# Patient Record
Sex: FEMALE | Race: WHITE | NOT HISPANIC OR LATINO
[De-identification: names, ages, dates, MRNs, and addresses within clinical notes are randomized per-mention and may not be internally consistent; named-entity substitution may affect disease eponyms.]

---
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
## 2022-08-29 ENCOUNTER — APPOINTMENT (OUTPATIENT)
Dept: PEDIATRICS | Facility: CLINIC | Age: 22
End: 2022-08-29

## 2022-08-29 VITALS
HEIGHT: 65.5 IN | HEART RATE: 83 BPM | TEMPERATURE: 98.7 F | WEIGHT: 117.13 LBS | DIASTOLIC BLOOD PRESSURE: 71 MMHG | BODY MASS INDEX: 19.28 KG/M2 | SYSTOLIC BLOOD PRESSURE: 107 MMHG

## 2022-08-29 DIAGNOSIS — Z87.09 PERSONAL HISTORY OF OTHER DISEASES OF THE RESPIRATORY SYSTEM: ICD-10-CM

## 2022-08-29 DIAGNOSIS — J18.9 PNEUMONIA, UNSPECIFIED ORGANISM: ICD-10-CM

## 2022-08-29 DIAGNOSIS — H00.029 HORDEOLUM INTERNUM UNSPECIFIED EYE, UNSPECIFIED EYELID: ICD-10-CM

## 2022-08-29 DIAGNOSIS — M41.9 SCOLIOSIS, UNSPECIFIED: ICD-10-CM

## 2022-08-29 DIAGNOSIS — Z00.129 ENCOUNTER FOR ROUTINE CHILD HEALTH EXAMINATION W/OUT ABNORMAL FINDINGS: ICD-10-CM

## 2022-08-29 DIAGNOSIS — R53.81 OTHER MALAISE: ICD-10-CM

## 2022-08-29 DIAGNOSIS — Z23 ENCOUNTER FOR IMMUNIZATION: ICD-10-CM

## 2022-08-29 DIAGNOSIS — F32.A DEPRESSION, UNSPECIFIED: ICD-10-CM

## 2022-08-29 DIAGNOSIS — Z09 ENCOUNTER FOR FOLLOW-UP EXAMINATION AFTER COMPLETED TREATMENT FOR CONDITIONS OTHER THAN MALIGNANT NEOPLASM: ICD-10-CM

## 2022-08-29 DIAGNOSIS — Z20.822 CONTACT WITH AND (SUSPECTED) EXPOSURE TO COVID-19: ICD-10-CM

## 2022-08-29 PROCEDURE — 96127 BRIEF EMOTIONAL/BEHAV ASSMT: CPT

## 2022-08-29 PROCEDURE — 90471 IMMUNIZATION ADMIN: CPT

## 2022-08-29 PROCEDURE — 96160 PT-FOCUSED HLTH RISK ASSMT: CPT | Mod: 59

## 2022-08-29 PROCEDURE — 99395 PREV VISIT EST AGE 18-39: CPT | Mod: 25

## 2022-08-29 PROCEDURE — 90621 MENB-FHBP VACC 2/3 DOSE IM: CPT

## 2022-08-29 RX ORDER — AMOXICILLIN AND CLAVULANATE POTASSIUM 875; 125 MG/1; MG/1
875-125 TABLET, COATED ORAL
Qty: 14 | Refills: 0 | Status: COMPLETED | COMMUNITY
Start: 2021-05-13 | End: 2022-08-29

## 2022-08-29 RX ORDER — AZITHROMYCIN 250 MG/1
250 TABLET, FILM COATED ORAL
Qty: 1 | Refills: 0 | Status: COMPLETED | COMMUNITY
Start: 2021-05-13 | End: 2022-08-29

## 2022-08-29 RX ORDER — CIPROFLOXACIN 3 MG/ML
0.3 SOLUTION OPHTHALMIC 3 TIMES DAILY
Qty: 1 | Refills: 0 | Status: COMPLETED | COMMUNITY
Start: 2022-08-04 | End: 2022-08-29

## 2022-08-29 RX ORDER — ALBUTEROL SULFATE 90 UG/1
108 (90 BASE) AEROSOL, METERED RESPIRATORY (INHALATION)
Qty: 1 | Refills: 0 | Status: COMPLETED | COMMUNITY
Start: 2021-05-13 | End: 2022-08-29

## 2022-08-29 NOTE — DISCUSSION/SUMMARY
[FreeTextEntry1] : 22 year old HARITHA RODRIGUES presents for her annual well visit.\par Normal PE. Doing well.\par Recently seasonal allergies in the spring and the fall.  Takes Zyrtec occasionally.  \par Sees dermatologist, has fungal and bacterial skin infection on chest.  Uses medicated shampoo for dandruff  ketoconazole. \par PHQ9- passed. Has history of mild depression.  Doing well. Sees therapist which has helped- therapist Cait in [place] but visits are her remotely\par CRAFFT- regular marijuana/vaping/occasional alcohol. She denies it as a problem.  Discussed the health risk of smoking or using  drugs or alcohol.  Even small amounts can lead to serious consequences.  Binge drinking can be fatal.  We discussed never driving if she has been drinking or using drugs.\par \par Trumemba vaccine #2 today:  Immunizations are up to date\par Routine lab work ordered.  Slips given.\par Return in 1 year for well visit. \par  \par \par \par

## 2022-08-29 NOTE — HISTORY OF PRESENT ILLNESS
[Needs Immunizations] : needs immunizations [Normal] : normal [Age of Menarche: ____] : Age of Menarche: [unfilled] [Eats meals with family] : eats meals with family [Has family members/adults to turn to for help] : has family members/adults to turn to for help [Is permitted and is able to make independent decisions] : Is permitted and is able to make independent decisions [Normal Performance] : normal performance [Normal Behavior/Attention] : normal behavior/attention [Normal Homework] : normal homework [Eats regular meals including adequate fruits and vegetables] : eats regular meals including adequate fruits and vegetables [Drinks non-sweetened liquids] : drinks non-sweetened liquids  [Calcium source] : calcium source [Has friends] : has friends [At least 1 hour of physical activity a day] : at least 1 hour of physical activity a day [Screen time (except homework) less than 2 hours a day] : screen time (except homework) less than 2 hours a day [Has interests/participates in community activities/volunteers] : has interests/participates in community activities/volunteers. [Uses drugs] : uses drugs  [Drinks alcohol] : drinks alcohol [No] : No cigarette smoke exposure [Uses safety belts/safety equipment] : uses safety belts/safety equipment  [Has peer relationships free of violence] : has peer relationships free of violence [Yes] : Patient has had sexual intercourse. [Has ways to cope with stress] : has ways to cope with stress [Displays self-confidence] : displays self-confidence [LMP: _____] : LMP: [unfilled] [Irregular menses] : irregular menses [Painful Cramps] : painful cramps [With Teen] : teen [Heavy Bleeding] : no heavy bleeding [Sleep Concerns] : no sleep concerns [Has concerns about body or appearance] : does not have concerns about body or appearance [Uses electronic nicotine delivery system] : does not use electronic nicotine delivery system [Uses tobacco] : does not use tobacco [Impaired/distracted driving] : no impaired/distracted driving [Has problems with sleep] : does not have problems with sleep [Gets depressed, anxious, or irritable/has mood swings] : does not get depressed, anxious, or irritable/has mood swings [Has thought about hurting self or considered suicide] : has not thought about hurting self or considered suicide [de-identified] : Needs 2nd Trumemba [FreeTextEntry8] : Sees GYN in [place]. Takes a supplement that helps with mood problems/PMS (Craverest). [de-identified] : Gold-was at [employer] in [place], now transferring to [employer] ([employer] [facility] [employer] [facility]). [de-identified] : Works out/Yoga [de-identified] : condoms, has had HIV screening, not currently sexually active; mostly males. [FreeTextEntry1] : 22 year old here for her routine well visit.\par Last well visit 2 years ago. \par Seen 05/13/21 for pneumonia and acute sinusitis,  had symptoms X 1 month.  Tx Augmentin/azithromycin/albuterol. Did well.   \par Recently seasonal allergies in the spring and the fall.  Takes Zyrtec occasionally.  \par \par Sees dermatologist, went for fungal and bacterial skin infection on chest.  Uses medicated shampoo for dandruff  ketoconazole. \par \par Sees therapist for mild depression- sees therapist Cait in [place] but sees her remotely.

## 2022-08-29 NOTE — PHYSICAL EXAM

## 2023-01-23 ENCOUNTER — APPOINTMENT (OUTPATIENT)
Dept: PEDIATRICS | Facility: CLINIC | Age: 23
End: 2023-01-23
Payer: COMMERCIAL

## 2023-01-23 DIAGNOSIS — F17.290 NICOTINE DEPENDENCE, OTHER TOBACCO PRODUCT, UNCOMPLICATED: ICD-10-CM

## 2023-01-23 PROCEDURE — 99442: CPT
